# Patient Record
Sex: FEMALE | Race: WHITE | HISPANIC OR LATINO | Employment: OTHER | URBAN - METROPOLITAN AREA
[De-identification: names, ages, dates, MRNs, and addresses within clinical notes are randomized per-mention and may not be internally consistent; named-entity substitution may affect disease eponyms.]

---

## 2022-11-21 PROBLEM — Z3A.19 19 WEEKS GESTATION OF PREGNANCY: Status: ACTIVE | Noted: 2022-10-12

## 2022-11-21 PROBLEM — Z3A.15 15 WEEKS GESTATION OF PREGNANCY: Status: RESOLVED | Noted: 2022-10-12 | Resolved: 2022-11-21

## 2022-11-25 PROBLEM — Z3A.20 20 WEEKS GESTATION OF PREGNANCY: Status: ACTIVE | Noted: 2022-10-12

## 2022-11-25 PROBLEM — O43.199 PLACENTAL CYST AFFECTING PREGNANCY: Status: RESOLVED | Noted: 2022-11-23 | Resolved: 2022-11-25

## 2022-11-25 PROBLEM — R03.0 ELEVATED BLOOD PRESSURE READING WITHOUT DIAGNOSIS OF HYPERTENSION: Status: ACTIVE | Noted: 2022-11-25

## 2022-11-25 PROBLEM — O35.EXX0 PYELECTASIS OF FETUS ON PRENATAL ULTRASOUND: Status: ACTIVE | Noted: 2022-11-25

## 2022-12-15 PROBLEM — Z3A.23 23 WEEKS GESTATION OF PREGNANCY: Status: ACTIVE | Noted: 2022-10-12

## 2022-12-15 NOTE — PROGRESS NOTES
Assessment & Plan  23 y o   at 23w4d presenting for routine prenatal visit  Problem List        Other    23 weeks gestation of pregnancy    Overview     TWG: + 7 167kg (recommended weight gain 25-35lbs)   TDAP vaccine at 28 weeks  [ ]Delivery consent to be signed at 28w  Contraception: undecided  Breastfeeding: yes   MSAFP to be drawn   RTC [id number] for routine prenatal care   : fetal urinary tract dilatation, repeat growth at 32 weeks         Prenatal care, second trimester    Overview     Continue PNV  Level II U/S - scheduled   First trimester screening WNL  Flu 10/24/22  msAFP ordered, will complete in 19th week of pregnancy         Pyelectasis of fetus on prenatal ultrasound    Elevated blood pressure reading without diagnosis of hypertension       ____________________________________________________________  Subjective  She is without complaint  She denies contractions, loss of fluid, or vaginal bleeding  She feels regular fetal movements  Objective  /84 (BP Location: Left arm, Patient Position: Sitting, Cuff Size: Adult)   Pulse (!) 110   Resp 18   Ht 5' 4" (1 626 m)   Wt 55 8 kg (123 lb)   LMP 2022   BMI 21 11 kg/m²   FHR: 150 bpm    Physical Exam  Constitutional:       Appearance: Normal appearance  HENT:      Head: Normocephalic and atraumatic  Cardiovascular:      Rate and Rhythm: Normal rate  Pulmonary:      Effort: Pulmonary effort is normal  No respiratory distress  Abdominal:      Palpations: Abdomen is soft  Tenderness: There is no abdominal tenderness  Musculoskeletal:         General: Normal range of motion  Neurological:      Mental Status: She is alert  Skin:     General: Skin is warm and dry            Patient's Active Problem List  Patient Active Problem List   Diagnosis   • 23 weeks gestation of pregnancy   • Prenatal care, second trimester   • Pyelectasis of fetus on prenatal ultrasound   • Elevated blood pressure reading without diagnosis of hypertension           Yaritza Mcclendon MD  OB/GYN PGY-4  12/19/2022  4:41 PM

## 2022-12-19 ENCOUNTER — ROUTINE PRENATAL (OUTPATIENT)
Dept: OBGYN CLINIC | Facility: CLINIC | Age: 22
End: 2022-12-19

## 2022-12-19 VITALS
HEIGHT: 64 IN | SYSTOLIC BLOOD PRESSURE: 130 MMHG | BODY MASS INDEX: 21 KG/M2 | HEART RATE: 110 BPM | WEIGHT: 123 LBS | DIASTOLIC BLOOD PRESSURE: 84 MMHG | RESPIRATION RATE: 18 BRPM

## 2022-12-19 DIAGNOSIS — Z34.92 PRENATAL CARE, SECOND TRIMESTER: ICD-10-CM

## 2022-12-19 DIAGNOSIS — Z3A.23 23 WEEKS GESTATION OF PREGNANCY: Primary | ICD-10-CM

## 2023-01-10 ENCOUNTER — APPOINTMENT (OUTPATIENT)
Dept: LAB | Facility: CLINIC | Age: 23
End: 2023-01-10

## 2023-01-10 DIAGNOSIS — Z3A.23 23 WEEKS GESTATION OF PREGNANCY: ICD-10-CM

## 2023-01-10 LAB
BASOPHILS # BLD AUTO: 0.04 THOUSANDS/ÂΜL (ref 0–0.1)
BASOPHILS NFR BLD AUTO: 0 % (ref 0–1)
EOSINOPHIL # BLD AUTO: 0.06 THOUSAND/ÂΜL (ref 0–0.61)
EOSINOPHIL NFR BLD AUTO: 1 % (ref 0–6)
ERYTHROCYTE [DISTWIDTH] IN BLOOD BY AUTOMATED COUNT: 12.9 % (ref 11.6–15.1)
GLUCOSE 1H P 50 G GLC PO SERPL-MCNC: 139 MG/DL (ref 40–134)
HCT VFR BLD AUTO: 33.6 % (ref 34.8–46.1)
HGB BLD-MCNC: 10.6 G/DL (ref 11.5–15.4)
IMM GRANULOCYTES # BLD AUTO: 0.07 THOUSAND/UL (ref 0–0.2)
IMM GRANULOCYTES NFR BLD AUTO: 1 % (ref 0–2)
LYMPHOCYTES # BLD AUTO: 1.61 THOUSANDS/ÂΜL (ref 0.6–4.47)
LYMPHOCYTES NFR BLD AUTO: 17 % (ref 14–44)
MCH RBC QN AUTO: 29.8 PG (ref 26.8–34.3)
MCHC RBC AUTO-ENTMCNC: 31.5 G/DL (ref 31.4–37.4)
MCV RBC AUTO: 94 FL (ref 82–98)
MONOCYTES # BLD AUTO: 0.56 THOUSAND/ÂΜL (ref 0.17–1.22)
MONOCYTES NFR BLD AUTO: 6 % (ref 4–12)
NEUTROPHILS # BLD AUTO: 7.41 THOUSANDS/ÂΜL (ref 1.85–7.62)
NEUTS SEG NFR BLD AUTO: 75 % (ref 43–75)
NRBC BLD AUTO-RTO: 0 /100 WBCS
PLATELET # BLD AUTO: 292 THOUSANDS/UL (ref 149–390)
PMV BLD AUTO: 11.1 FL (ref 8.9–12.7)
RBC # BLD AUTO: 3.56 MILLION/UL (ref 3.81–5.12)
WBC # BLD AUTO: 9.75 THOUSAND/UL (ref 4.31–10.16)

## 2023-01-11 ENCOUNTER — TELEPHONE (OUTPATIENT)
Dept: OBGYN CLINIC | Facility: CLINIC | Age: 23
End: 2023-01-11

## 2023-01-11 DIAGNOSIS — R73.09 ELEVATED GLUCOSE TOLERANCE TEST: Primary | ICD-10-CM

## 2023-01-11 LAB — RPR SER QL: NORMAL

## 2023-01-11 NOTE — TELEPHONE ENCOUNTER
Attempted to contact pt regarding results  Spoke with pt's spouse Tavia Jeter left a message with Tamar Grant to have pt call back our office for the lab results, Tamar Grant stated that pt will call tomorrow  Verbalized understanding no questions at this time  DC instructions/F/U PMD and Cards

## 2023-01-11 NOTE — TELEPHONE ENCOUNTER
----- Message from Denise Lazo MD sent at 1/11/2023  1:01 PM EST -----  Please inform patient that 1 hour GTT was elevated; she needs to complete the 3 hour GTT, which is ordered

## 2023-01-16 ENCOUNTER — ROUTINE PRENATAL (OUTPATIENT)
Dept: OBGYN CLINIC | Facility: CLINIC | Age: 23
End: 2023-01-16

## 2023-01-16 VITALS
SYSTOLIC BLOOD PRESSURE: 115 MMHG | BODY MASS INDEX: 20.9 KG/M2 | HEIGHT: 64 IN | WEIGHT: 122.4 LBS | DIASTOLIC BLOOD PRESSURE: 71 MMHG | HEART RATE: 97 BPM

## 2023-01-16 DIAGNOSIS — Z23 NEED FOR TDAP VACCINATION: ICD-10-CM

## 2023-01-16 DIAGNOSIS — Z3A.27 27 WEEKS GESTATION OF PREGNANCY: Primary | ICD-10-CM

## 2023-01-16 DIAGNOSIS — Z34.92 PRENATAL CARE, SECOND TRIMESTER: ICD-10-CM

## 2023-01-16 RX ORDER — PRENATAL NO.167/FOLIC ACID/DHA 0.4MG-25MG
TABLET,CHEWABLE ORAL
COMMUNITY

## 2023-01-16 NOTE — PROGRESS NOTES
Julia Burgos presents today for routine OB visit at 27w4d  She is a   Blood Pressure: 115/71  Wt=55 5 kg (122 lb 6 4 oz); Body mass index is 21 01 kg/m² ; TWG=9 253 kg (20 lb 6 4 oz)  Fetal Heart Rate: 145; Fundal Height (cm): 27 cm  Abdomen: gravid, soft, non-tender  She reports no complaints or concerns  Denies uterine contractions or persistent cramping  Denies vaginal bleeding or leaking of fluid  Reports fetal movement  Scheduled for ultrasound 23  Reviewed elevated 1 hour GTT results, orders placed for 3 hour GTT  Reviewed instructions for testing  Discussed tdap vaccine, she accepts this today  Reviewed common discomforts of pregnancy in second trimester and warning signs  Advised to continue medications and return in 2 weeks  Current Outpatient Medications   Medication Instructions   • doxylamine (UNISOM) 12 5 mg, Oral, Daily at bedtime PRN   • Prenatal MV & Min w/FA-DHA (One A Day Prenatal) 0 4-25 MG CHEW Oral   • pyridoxine (B-6) 25 mg, Oral, 3 times daily     Laboratory workup: initial OB labs (complete); 28 week labs (1hr GTT elevated at 139, 3hr GTT ordered); 36 week cultures (collect @36w)    Genetic Screening: NIPS low risk    Vaccinations: influenza (given 10/24/22); Tdap (given 23); COVID-19 (recommended)    Postpartum contraception: desires unsure     Fetal Ultrasounds:  10/12/22 13w6d: Normal growth  Posterior placenta  A surface placental cyst is appreciated adjacent to the umbilical cord insertion  Follow-up ultrasounds will further elucidate the significance of this finding which generally resolves throughout pregnancy  F/u at 20w  22 20w1d: Vertex  Normal growth  Posterior placenta  Bilateral low risk urinary tract dilation (UTDA1), which has a high likelihood of spontaneous resolution prenatally, or after birth   Follow-up evaluation of fetal growth and re-evaluation of fetal kidneys was advised at around 32 weeks gestation  23 Next US    DELFINA 4/13/2023 Problems (from 08/30/22 to present)     Problem Noted Resolved    Prenatal care, second trimester 10/24/2022 by Eron Hayes MD No    Overview Addendum 10/24/2022  6:25 PM by Eron Hayes MD     Continue PNV  Level II U/S - scheduled 11/25  First trimester screening WNL  Flu 10/24/22  msAFP ordered, will complete in 19th week of pregnancy         27 weeks gestation of pregnancy 10/12/2022 by Samantha Ashford MD No    Overview Addendum 12/15/2022 11:52 AM by Chris Marquez MD     TWG: + 7 167kg (recommended weight gain 25-35lbs)   TDAP vaccine at 28 weeks  [ ]Delivery consent to be signed at 28w  Contraception: undecided  Breastfeeding: yes   MSAFP to be drawn   RTC [id number] for routine prenatal care   11/25: fetal urinary tract dilatation, repeat growth at 32 weeks

## 2023-01-18 NOTE — PROGRESS NOTES
28 week education packet provided to patient on 01/18/23  Included in packet: Third Trimester paperwork  Delivery consent   Birthing room support person rules and acknowledgment  Birth Plan   Welcome information  Birth certificate worksheet   Consent for Photographers  Perineal/ Vaginal massage   Pediatric practices and locations     Breast pump not ordered, pt is self pay      Tdap given

## 2023-01-23 ENCOUNTER — TELEPHONE (OUTPATIENT)
Dept: OBGYN CLINIC | Facility: CLINIC | Age: 23
End: 2023-01-23

## 2023-01-23 ENCOUNTER — LAB (OUTPATIENT)
Dept: LAB | Facility: CLINIC | Age: 23
End: 2023-01-23

## 2023-01-23 DIAGNOSIS — Z34.92 PRENATAL CARE, SECOND TRIMESTER: ICD-10-CM

## 2023-01-23 DIAGNOSIS — Z3A.27 27 WEEKS GESTATION OF PREGNANCY: ICD-10-CM

## 2023-01-23 LAB
GLUCOSE 1H P 100 G GLC PO SERPL-MCNC: 165 MG/DL (ref 65–179)
GLUCOSE 2H P 100 G GLC PO SERPL-MCNC: 131 MG/DL (ref 65–154)
GLUCOSE 3H P 100 G GLC PO SERPL-MCNC: 147 MG/DL (ref 65–139)
GLUCOSE P FAST SERPL-MCNC: 80 MG/DL (ref 65–94)
GLUCOSE SERPL-MCNC: 75 MG/DL (ref 65–140)

## 2023-01-23 NOTE — TELEPHONE ENCOUNTER
----- Message from Ade Kaba, [address]  sent at 1/23/2023  1:34 PM EST -----  Please let her know that she passed her 3 hour glucose test and she does not have gestational diabetes  Thank you!

## 2023-01-23 NOTE — TELEPHONE ENCOUNTER
Pt speaks Hungarian , Beestar  ( [phone]) assisted in making this call   Pt notified Normal 3hr glucose test

## 2023-02-14 PROBLEM — Z3A.32 32 WEEKS GESTATION OF PREGNANCY: Status: ACTIVE | Noted: 2022-10-12

## 2023-02-14 NOTE — ASSESSMENT & PLAN NOTE
Delivery consent signed today  [facility]  present  All questions answered to patient and partner's satisfaction  Return to office in 2 weeks

## 2023-02-14 NOTE — PROGRESS NOTES
[address] [address]  [id number]  2000        ASSESSMENT/PLAN:  Problem List        Other    32 weeks gestation of pregnancy    Overview     TWG: + 7 167kg (recommended weight gain 25-35lbs)   TDAP vaccine at 28 weeks  Delivery consent signed at 28w  Contraception: undecided  Breastfeeding: yes   MSAFP to be drawn   RTC [id number] for routine prenatal care   : fetal urinary tract dilatation, repeat growth at 32 weeks         Current Assessment & Plan     Delivery consent signed today  [facility]  present  All questions answered to patient and partner's satisfaction  Return to office in 2 weeks  Prenatal care, second trimester    Overview     Continue PNV  Level II U/S - scheduled   First trimester screening WNL  Flu 10/24/22  msAFP ordered, will complete in 19th week of pregnancy         Pyelectasis of fetus on prenatal ultrasound    Elevated blood pressure reading without diagnosis of hypertension               Subjective: 23 y o   31w5d here for prenatal visit  She denies consistent contractions but endorses occasionally cramping  She denies leakage of fluid and vaginal bleeding  She endorses good fetal movement  Objective:  Pre- Vitals    Flowsheet Row Most Recent Value   Prenatal Assessment    Prenatal Vitals    Blood Pressure 104/67   Weight - Scale 57 2 kg (126 lb 3 2 oz)   Urine Albumin/Glucose    Dilation/Effacement/Station    Vaginal Drainage    Edema            Pregnancy Plan:  Pregnancy: Kelli Bumps  Fetal sex: Male     Delivery Plans  Planned delivery method: Vaginal  Planned delivery location: [facility]  Planned anesthesia: Epidural  Acceptable blood products: All     Post-Delivery Plans  Feeding intentions: Breast Milk      General: Well appearing, no distress  Respiratory: Unlabored breathing  Cardiovascular: Regular rate  FHR: 143bpm  Abdomen: Soft, gravid, nontender    Fundal Height: Appropriate for gestational age  31cm  Extremities: Warm and well perfused  Non tender          D/w Dr Prince Wang MD  OBGYN PGY-1  2/17/2023 8:59 AM

## 2023-02-17 ENCOUNTER — ROUTINE PRENATAL (OUTPATIENT)
Dept: OBGYN CLINIC | Facility: CLINIC | Age: 23
End: 2023-02-17

## 2023-02-17 ENCOUNTER — ULTRASOUND (OUTPATIENT)
Facility: HOSPITAL | Age: 23
End: 2023-02-17

## 2023-02-17 VITALS
HEART RATE: 93 BPM | SYSTOLIC BLOOD PRESSURE: 104 MMHG | BODY MASS INDEX: 21.54 KG/M2 | HEIGHT: 64 IN | DIASTOLIC BLOOD PRESSURE: 67 MMHG | WEIGHT: 126.2 LBS

## 2023-02-17 VITALS
DIASTOLIC BLOOD PRESSURE: 62 MMHG | BODY MASS INDEX: 21.01 KG/M2 | SYSTOLIC BLOOD PRESSURE: 118 MMHG | WEIGHT: 126.1 LBS | HEIGHT: 65 IN | HEART RATE: 91 BPM

## 2023-02-17 DIAGNOSIS — Z3A.32 32 WEEKS GESTATION OF PREGNANCY: Primary | ICD-10-CM

## 2023-02-17 DIAGNOSIS — Z3A.32 32 WEEKS GESTATION OF PREGNANCY: ICD-10-CM

## 2023-02-17 DIAGNOSIS — O36.5930 POOR FETAL GROWTH AFFECTING MANAGEMENT OF MOTHER IN THIRD TRIMESTER, SINGLE OR UNSPECIFIED FETUS: Primary | ICD-10-CM

## 2023-02-17 NOTE — LETTER
February 17, 2023     Anais De La Torre, [address] [place] [address]    Patient: Shimon Tomlinson   YOB: 2000   Date of Visit: 2/17/2023       Dear Dr Mirza Lugo: Thank you for referring Shimon Tomlinson to me for evaluation  Below are my notes for this consultation  If you have questions, please do not hesitate to call me  I look forward to following your patient along with you  Sincerely,        Crystal Dsouza MD        CC: No Recipients  Crystal Dsouza MD  2/17/2023 11:43 PM  Sign when Signing Visit  Shimon Tomlinson has no complaints today  She reports regular fetal movements and does not report any problems  She is here today at 32w1d for an ultrasound for growth review the renal pyelectasis noted on her previous scan  On her phone Rejiroshni Acosta had a certified  who reports her name was Sari Acosta so I use this  for today's discussion  Problem list:  1  Lateral renal pyelectasis measuring 4 to 5 mm  2  Low BMI    Ultrasound findings: The ultrasound today shows a fetus who is measuring in the 9th percentile with the fetal abdomen in the 6 percentile  Umbilical Dopplers SHAQUILLE and biophysical profile are reassuring  The fetal pyelectasis has resolved  Pregnancy ultrasound has limitations and is unable to detect all forms of fetal congenital abnormalities  The inaccuracy in the EFW can be off by 1 lb either way in the third trimester  Specific counseling was provided on the following problems:  1  Intrauterine Growth Restriction (IUGR): We reviewed the finding of fetal growth restriction today, which is defined either as estimated fetal weight, OR fetal abdominal circumference <10% ile for gestational age   Possible etiologies include incorrect dates, ultrasound variance, family history of small babies, genetic syndromes, congenital infections, maternal medical comorbidities, and uteroplacental insufficiency  In the third trimester ultrasound estimated weights can be off by 1 lb either way  For isolated fetal growth restriction between the 3 to 9% without additional findings, I recommend close outpatient surveillance with weekly NST, and weekly evaluation of amniotic fluid and umbilical artery Dopplers  Fetal growth should be re-assessed every 3 weeks  If the above  surveillance is reassuring, and Doppler indices remain normal, delivery is recommended between 38 0/7 and 39 6/7 weeks gestation pending future growth scans  Change in umbilical artery Dopplers, non-reassuring antepartum surveillance, or the presence of new obstetric indications may be an indication for sooner delivery  Fetal kick counting is recommended daily  She should be closely observed for symptoms of preeclampsia or gestational hypertension which can be found more often in pregnancies being monitored for fetal growth restriction  Follow up recommended:   1  Recommend a follow-up ultrasound for SHAQUILLE NST and Dopplers weekly with a follow-up growth scan in 3 weeks  2  Recommend daily fetal kick counts  Pre visit time reviewing her records   10 minutes  Face to face time 15 minutes  Post visit time on documentation of note, updating her problem list, adding orders and prescriptions 10 minutes  Procedures that were completed today were charged separately  The level of decision making was moderate complexity      Moe Sanchez MD

## 2023-02-18 NOTE — PROGRESS NOTES
Balbir Cortez has no complaints today  She reports regular fetal movements and does not report any problems  She is here today at 32w1d for an ultrasound for growth review the renal pyelectasis noted on her previous scan  On her phone Yanet Munguia had a certified  who reports her name was Con Munguia so I use this  for today's discussion  Problem list:  1  Lateral renal pyelectasis measuring 4 to 5 mm  2  Low BMI    Ultrasound findings: The ultrasound today shows a fetus who is measuring in the 9th percentile with the fetal abdomen in the 6 percentile  Umbilical Dopplers SHAQUILLE and biophysical profile are reassuring  The fetal pyelectasis has resolved  Pregnancy ultrasound has limitations and is unable to detect all forms of fetal congenital abnormalities  The inaccuracy in the EFW can be off by 1 lb either way in the third trimester  Specific counseling was provided on the following problems:  1  Intrauterine Growth Restriction (IUGR): We reviewed the finding of fetal growth restriction today, which is defined either as estimated fetal weight, OR fetal abdominal circumference <10% ile for gestational age  Possible etiologies include incorrect dates, ultrasound variance, family history of small babies, genetic syndromes, congenital infections, maternal medical comorbidities, and uteroplacental insufficiency  In the third trimester ultrasound estimated weights can be off by 1 lb either way  For isolated fetal growth restriction between the 3 to 9% without additional findings, I recommend close outpatient surveillance with weekly NST, and weekly evaluation of amniotic fluid and umbilical artery Dopplers  Fetal growth should be re-assessed every 3 weeks  If the above  surveillance is reassuring, and Doppler indices remain normal, delivery is recommended between 38 0/7 and 39 6/7 weeks gestation pending future growth scans   Change in umbilical artery Dopplers, non-reassuring antepartum surveillance, or the presence of new obstetric indications may be an indication for sooner delivery  Fetal kick counting is recommended daily  She should be closely observed for symptoms of preeclampsia or gestational hypertension which can be found more often in pregnancies being monitored for fetal growth restriction  Follow up recommended:   1  Recommend a follow-up ultrasound for SHAQUILLE NST and Dopplers weekly with a follow-up growth scan in 3 weeks  2  Recommend daily fetal kick counts  Pre visit time reviewing her records   10 minutes  Face to face time 15 minutes  Post visit time on documentation of note, updating her problem list, adding orders and prescriptions 10 minutes  Procedures that were completed today were charged separately  The level of decision making was moderate complexity      Bernadette Jimenez MD

## 2023-02-23 NOTE — PATIENT INSTRUCTIONS
Thank you for choosing us for your  care today  If you have any questions about your ultrasound or care, please do not hesitate to contact us or your primary obstetrician  Some general instructions for your pregnancy are:    Exercise: Aim for 22 minutes per day (150 minutes per week) of regular exercise  Walking is great! Nutrition: Choose healthy sources of calcium, iron, and protein  Learn about Preeclampsia: preeclampsia is a common, serious high blood pressure complication in pregnancy  A blood pressure of 968BAQX (systolic or top number) or 75OAWD (diastolic or bottom number) is not normal and needs evaluation by your doctor  Aspirin is sometimes prescribed in early pregnancy to prevent preeclampsia in women with risk factors - ask your obstetrician if you should be on this medication  For more resources, visit:  [employer] fi  If you smoke, try to reduce how many cigarettes you smoke or try to quit completely  Do not vape  Other warning signs to watch out for in pregnancy or postpartum: chest pain, obstructed breathing or shortness of breath, seizures, thoughts of hurting yourself or your baby, bleeding, a painful or swollen leg, fever, or headache (see [facility] POST-BIRTH Warning Signs campaign)  If these happen call 911  Itching is also not normal in pregnancy and if you experience this, especially over your hands and feet, potentially worse at night, notify your doctors

## 2023-02-24 ENCOUNTER — TELEPHONE (OUTPATIENT)
Dept: OBGYN CLINIC | Facility: CLINIC | Age: 23
End: 2023-02-24

## 2023-02-24 ENCOUNTER — ULTRASOUND (OUTPATIENT)
Facility: HOSPITAL | Age: 23
End: 2023-02-24

## 2023-02-24 VITALS
HEART RATE: 119 BPM | SYSTOLIC BLOOD PRESSURE: 120 MMHG | WEIGHT: 125.22 LBS | DIASTOLIC BLOOD PRESSURE: 58 MMHG | HEIGHT: 65 IN | BODY MASS INDEX: 20.86 KG/M2

## 2023-02-24 DIAGNOSIS — O36.5930 POOR FETAL GROWTH AFFECTING MANAGEMENT OF MOTHER IN THIRD TRIMESTER, SINGLE OR UNSPECIFIED FETUS: Primary | ICD-10-CM

## 2023-02-24 NOTE — PROGRESS NOTES
Non-Stress Testing:    Non-Stress test, equipment, procedure, and expected outcomes reviewed  Reviewed fetal kick counts and when to call OB  Karrie Nissen Verified patient understanding of fetal kick counts with teach back method  Patient reports feeling daily fetal movements  Patient has no questions or concerns  DR Jaelyn Mahajan viewed NST prior to completion of appointment

## 2023-02-24 NOTE — TELEPHONE ENCOUNTER
Pt c/o "burning in spine" every night x5 days  Pain radiates from midback down to lowback and is a 7/10 on pain scale  Pt reports it happens depending on position and that changing positions does help briefly  She has not tried tylenol for this pain  Will try tylenol and heat  If symptoms worsen over the weekend she will report to triage for further evaluation

## 2023-02-24 NOTE — LETTER
NST sleeve cover sheet    Patient name: Chika Hensley  : 2000  MRN: [id number]    DELFINA: Estimated Date of Delivery: 23    Obstetrician: [address]    Reason(s) for testing:  FGR 3-9%  __________________________________________      Testing frequency:    ___ 2x/wk  _X__ 1x/wk  ___ Dopplers  ___ BPP?       Last growth scan: __________________________________________

## 2023-02-24 NOTE — PROGRESS NOTES
[facility]: Ms Keara Contreras was seen today for umbilical artery Doppler study, SHAQUILLE, and NST  See ultrasound report under "OB Procedures" tab  Please don't hesitate to contact our office with any concerns or questions    Naida Tom MD

## 2023-02-28 ENCOUNTER — ULTRASOUND (OUTPATIENT)
Dept: PERINATAL CARE | Facility: CLINIC | Age: 23
End: 2023-02-28

## 2023-02-28 VITALS
HEIGHT: 61 IN | HEART RATE: 98 BPM | BODY MASS INDEX: 23.37 KG/M2 | DIASTOLIC BLOOD PRESSURE: 62 MMHG | WEIGHT: 123.8 LBS | SYSTOLIC BLOOD PRESSURE: 108 MMHG

## 2023-02-28 DIAGNOSIS — O36.5930 POOR FETAL GROWTH AFFECTING MANAGEMENT OF MOTHER IN THIRD TRIMESTER, SINGLE OR UNSPECIFIED FETUS: Primary | ICD-10-CM

## 2023-02-28 DIAGNOSIS — Z3A.33 33 WEEKS GESTATION OF PREGNANCY: ICD-10-CM

## 2023-02-28 NOTE — PATIENT INSTRUCTIONS
Kick Counts in Pregnancy   WHAT YOU NEED TO KNOW:   What do I need to know about kick counts? Kick counts measure how much your baby is moving in your womb  A kick from your baby can be felt as a twist, turn, swish, roll, or jab  It is common to feel your baby kicking at 26 to 28 weeks of pregnancy  You may feel your baby kick as early as 20 weeks of pregnancy  You may want to start counting at 28 weeks  Why should I measure kick counts? Your baby's movement may provide information about your baby's health  He or she may move less, or not at all, if there are problems  Your baby may move less if he or she is not getting enough oxygen or nutrition from the placenta  Do not smoke while you are pregnant  Smoking decreases the amount of oxygen that gets to your baby  Talk to your healthcare provider if you need help to quit smoking  Tell your healthcare provider as soon as you feel a change in your baby's movements  When do I measure kick counts? Measure kick counts at the same time every day  Measure kick counts when your baby is awake and most active  Your baby may be most active in the evening  How do I measure kick counts? Check that your baby is awake before you measure kick counts  You can wake up your baby by lightly pushing on your belly, walking, or drinking something cold  Your healthcare provider may tell you different ways to measure kick counts  You may be told to do the following:  Use a chart or clock to keep track of the time you start and finish counting  Sit in a chair or lie on your left side  Place your hands on the largest part of your belly  Count until you reach 10 kicks  Write down how much time it takes to count 10 kicks  It may take 30 minutes to 2 hours to count 10 kicks  It should not take more than 2 hours to count 10 kicks  When should I contact my doctor? You feel a change in the number of kicks or movements of your baby       You feel fewer than 10 kicks within 2 hours  You have questions or concerns about your baby's movements  CARE AGREEMENT:   You have the right to help plan your care  Learn about your health condition and how it may be treated  Discuss treatment options with your healthcare providers to decide what care you want to receive  You always have the right to refuse treatment  The above information is an  only  It is not intended as medical advice for individual conditions or treatments  Talk to your doctor, nurse or pharmacist before following any medical regimen to see if it is safe and effective for you  © Copyright [employer] 2022 Information is for End User's use only and may not be sold, redistributed or otherwise used for commercial purposes

## 2023-02-28 NOTE — LETTER
February 28, 2023     Stephany Langley, [address] [place], [place] [address]    Patient: Chino Johnson   YOB: 2000   Date of Visit: 2/28/2023       Dear Dr Meghan Hawkins: Thank you for referring Chino Johnson to me for evaluation  Below are my notes for this consultation  If you have questions, please do not hesitate to call me  I look forward to following your patient along with you           Sincerely,        Ivania Banda MD        CC: No Recipients  Ivania Banda MD  2/28/2023  9:02 AM  Sign when Signing Visit  NST is reactive and umbilical Dopplers are normal  Anuradha Odonnell

## 2023-03-03 ENCOUNTER — ROUTINE PRENATAL (OUTPATIENT)
Dept: OBGYN CLINIC | Facility: CLINIC | Age: 23
End: 2023-03-03

## 2023-03-03 VITALS
HEART RATE: 112 BPM | DIASTOLIC BLOOD PRESSURE: 66 MMHG | HEIGHT: 61 IN | SYSTOLIC BLOOD PRESSURE: 112 MMHG | WEIGHT: 123.2 LBS | BODY MASS INDEX: 23.26 KG/M2

## 2023-03-03 DIAGNOSIS — Z3A.34 34 WEEKS GESTATION OF PREGNANCY: ICD-10-CM

## 2023-03-03 DIAGNOSIS — Z34.93 PRENATAL CARE IN THIRD TRIMESTER: Primary | ICD-10-CM

## 2023-03-03 NOTE — PATIENT INSTRUCTIONS
MEDICATIONS THAT ARE SAFE TO TAKE     Here is a list of medications which are safe for you to take during pregnancy without having to discuss with the nurse practitioner or physician:     * Tylenol/Acetaminophen (headache or fever)   * Benadryl/Diphenhydramine (allergies, runny nose, difficult sleeping, itching)   * Claritin/Loratidine (allergies, runny nose)   * Zyrtec/Cetirizine (allergies, runny nose)   * Allegra/Fexofenadine (allergies)  * Robitussin/Guaifenesin (coughing)   * Sudafed/Pseudoephedrine (runny nose/congestion)   * Colace/Docusate sodium (constipation)   * Maalox/Magnesium hydroxide (heartburn, indigestion)   * Zantac/Ranitidine (heartburn, indigestion)   * Pepcid/Famotidine (heartburn, indigestion)   * Tums/Calcium carbonate (heartburn, nausea)   * Kaopectate/Bismuth subsalicylate (diarrhea)   * Immodium/Loperamide (diarrhea)   * Vitamin B6/Pyrodoxine(nausea)   * Doxylamine/Unisom (nausea, insomnia)     Any other medications should be discussed with either our nurse practitioner or one of our physicians before taking

## 2023-03-03 NOTE — PROGRESS NOTES
Nova Partida presents today for routine OB visit at 34w1d  She is a   Pregnancy is complicated by possible FGR, she is following with [address] [place] [address] for  testing and serial growth scans  Blood Pressure: 112/66  Wt=55 9 kg (123 lb 3 2 oz); Body mass index is 22 96 kg/m² ; TWG=9 616 kg (21 lb 3 2 oz)  Fetal Heart Rate: 130; Fundal Height (cm): 33 cm  Abdomen: gravid, soft, non-tender  She reports mild nasal congestion  Had a low grade fever and body aches earlier in the week, feeling better now  Denies uterine contractions  Denies vaginal bleeding or leaking of fluid  Reports adequate fetal movement of at least 10 movements in 2 hours once daily  Scheduled for  testing 3/8/23  Discussed likely viral illness earlier this week  Reviewed comfort measures and safe medications for nasal congestion  Reviewed premature labor precautions and fetal kick counts  Advised to continue medications and return in 2 weeks  Current Outpatient Medications   Medication Instructions   • doxylamine (UNISOM) 12 5 mg, Oral, Daily at bedtime PRN   • Prenatal MV & Min w/FA-DHA (One A Day Prenatal) 0 4-25 MG CHEW Oral   • pyridoxine (B-6) 25 mg, Oral, 3 times daily     Laboratory workup: initial OB labs (complete); 28 week labs (1hr GTT elevated at 139, 3hr GTT normal); 36 week cultures (collect @36w)    Genetic Screening: NIPS low risk    Vaccinations: influenza (given 10/24/22); Tdap (received 23); COVID-19 (recommended)    Postpartum contraception: desires unsure     Fetal Ultrasounds:  10/12/22 13w6d: Normal growth  Posterior placenta  A surface placental cyst is appreciated adjacent to the umbilical cord insertion  Follow-up ultrasounds will further elucidate the significance of this finding which generally resolves throughout pregnancy  F/u at 20w  22 20w1d: Vertex  Normal growth  Posterior placenta   Bilateral low risk urinary tract dilation (UTDA1), which has a high likelihood of spontaneous resolution prenatally, or after birth  Follow-up evaluation of fetal growth and re-evaluation of fetal kidneys was advised at around 32 weeks gestation  23 32w1d: Vertex  Posterior placenta  Possible FGR, EFW 9% with abdomen in 6%  Fetal pyelectasis resolved  recommend close outpatient surveillance with weekly NST, and weekly evaluation of amniotic fluid and umbilical artery Dopplers  Fetal growth should be re-assessed every 3 weeks  If the above  surveillance is reassuring, and Doppler indices remain normal, delivery is recommended between 38 0/7 and 39 6/7 weeks gestation pending future growth scans  DELFINA 2023 Problems (from 22 to present)     Problem Noted Resolved    Poor fetal growth affecting management of mother in third trimester 2023 by Zhao Quevedo MD No    Overview Addendum 3/3/2023  2:24 PM by ANATOLY Sorensen     -Noted at 32 weeks  9th percentile with normal Dopplers  -Recommend close outpatient surveillance with weekly NST, and weekly evaluation of amniotic fluid and umbilical artery Dopplers  Fetal growth should be re-assessed every 3 weeks  If the above  surveillance is reassuring, and Doppler indices remain normal, delivery is recommended between 38 0/7 and 39 6/7 weeks gestation pending future growth scans              Prenatal care in third trimester 10/24/2022 by Luiza Bowman MD No    Overview Addendum 10/24/2022  6:25 PM by Luiza Bowman MD     Continue PNV  Level II U/S - scheduled   First trimester screening WNL  Flu 10/24/22  msAFP ordered, will complete in 19th week of pregnancy         34 weeks gestation of pregnancy 10/12/2022 by Paul Albright MD No    Overview Addendum 2023  8:50 AM by Ted Paulino MD     TWG: + 7 167kg (recommended weight gain 25-35lbs)   TDAP vaccine at 28 weeks  Delivery consent signed at 28w  Contraception: undecided  Breastfeeding: yes   MSAFP to be drawn RTC [id number] for routine prenatal care  US 11/25: fetal urinary tract dilatation, repeat growth at 32 weeks

## 2023-03-07 NOTE — PATIENT INSTRUCTIONS
Thank you for choosing us for your  care today  If you have any questions about your ultrasound or care, please do not hesitate to contact us or your primary obstetrician  Some general instructions for your pregnancy are:    Exercise: Aim for 22 minutes per day (150 minutes per week) of regular exercise  Walking is great! Nutrition: Choose healthy sources of calcium, iron, and protein  Learn about Preeclampsia: preeclampsia is a common, serious high blood pressure complication in pregnancy  A blood pressure of 692AQCL (systolic or top number) or 23OCSE (diastolic or bottom number) is not normal and needs evaluation by your doctor  Aspirin is sometimes prescribed in early pregnancy to prevent preeclampsia in women with risk factors - ask your obstetrician if you should be on this medication  For more resources, visit:  [employer] fi  If you smoke, try to reduce how many cigarettes you smoke or try to quit completely  Do not vape  Other warning signs to watch out for in pregnancy or postpartum: chest pain, obstructed breathing or shortness of breath, seizures, thoughts of hurting yourself or your baby, bleeding, a painful or swollen leg, fever, or headache (see [facility] POST-BIRTH Warning Signs campaign)  If these happen call 911  Itching is also not normal in pregnancy and if you experience this, especially over your hands and feet, potentially worse at night, notify your doctors

## 2023-03-08 ENCOUNTER — ULTRASOUND (OUTPATIENT)
Facility: HOSPITAL | Age: 23
End: 2023-03-08

## 2023-03-08 VITALS
HEART RATE: 108 BPM | BODY MASS INDEX: 23.26 KG/M2 | WEIGHT: 124.78 LBS | DIASTOLIC BLOOD PRESSURE: 62 MMHG | SYSTOLIC BLOOD PRESSURE: 120 MMHG

## 2023-03-08 DIAGNOSIS — O36.5930 POOR FETAL GROWTH AFFECTING MANAGEMENT OF MOTHER IN THIRD TRIMESTER, SINGLE OR UNSPECIFIED FETUS: Primary | ICD-10-CM

## 2023-03-08 DIAGNOSIS — Z36.89 ENCOUNTER FOR ULTRASOUND TO ASSESS FETAL GROWTH: ICD-10-CM

## 2023-03-08 DIAGNOSIS — Z3A.34 34 WEEKS GESTATION OF PREGNANCY: ICD-10-CM

## 2023-03-08 DIAGNOSIS — R03.0 ELEVATED BLOOD PRESSURE READING WITHOUT DIAGNOSIS OF HYPERTENSION: ICD-10-CM

## 2023-03-08 NOTE — PROGRESS NOTES
[facility]: Ms Nabil Shaikh was seen today for fetal growth assessment ultrasound  See ultrasound report under "OB Procedures" tab  The time spent on this established patient on the encounter date included 5 minutes previsit service time reviewing records and precharting, 6 minutes face-to-face service time counseling regarding results and coordinating care, and  5 minutes charting, totalling 16 minutes  Please don't hesitate to contact our office with any concerns or questions    -Mitchell Garvin MD

## 2023-03-17 ENCOUNTER — ROUTINE PRENATAL (OUTPATIENT)
Dept: OBGYN CLINIC | Facility: CLINIC | Age: 23
End: 2023-03-17

## 2023-03-17 VITALS
SYSTOLIC BLOOD PRESSURE: 116 MMHG | WEIGHT: 127.6 LBS | BODY MASS INDEX: 24.09 KG/M2 | HEART RATE: 102 BPM | DIASTOLIC BLOOD PRESSURE: 76 MMHG | HEIGHT: 61 IN

## 2023-03-17 DIAGNOSIS — Z34.93 PRENATAL CARE IN THIRD TRIMESTER: ICD-10-CM

## 2023-03-17 DIAGNOSIS — Z3A.36 36 WEEKS GESTATION OF PREGNANCY: Primary | ICD-10-CM

## 2023-03-17 NOTE — PROGRESS NOTES
Fabienne  presents today for routine OB visit at 36w1d  She is a   Blood Pressure: 116/76  Wt=57 9 kg (127 lb 9 6 oz); Body mass index is 23 78 kg/m² ; TWG=11 6 kg (25 lb 9 6 oz)  Fetal Heart Rate: 125; Fundal Height (cm): 35 cm  Abdomen: gravid, soft, non-tender  She reports intermittent sciatica on the left side  Denies uterine contractions  Denies vaginal bleeding or leaking of fluid  Reports adequate fetal movement of at least 10 movements in 2 hours once daily  Scheduled for ultrasound 23  GBS culture collected today  Reviewed discomforts of third trimester and comfort measures  Reviewed premature labor precautions and fetal kick counts  Advised to continue medications and return in 1 weeks  Current Outpatient Medications   Medication Instructions   • doxylamine (UNISOM) 12 5 mg, Oral, Daily at bedtime PRN   • Prenatal MV & Min w/FA-DHA (One A Day Prenatal) 0 4-25 MG CHEW Oral   • pyridoxine (B-6) 25 mg, Oral, 3 times daily     Laboratory workup: initial OB labs (complete); 28 week labs (1hr GTT elevated at 139, 3hr GTT normal); 36 week cultures (collected 3/17/23)    Genetic Screening: NIPS low risk    Vaccinations: influenza (given 10/24/22); Tdap (received 23); COVID-19 (recommended)    Postpartum contraception: desires unsure     Fetal Ultrasounds:  10/12/22 13w6d: Normal growth  Posterior placenta  A surface placental cyst is appreciated adjacent to the umbilical cord insertion  Follow-up ultrasounds will further elucidate the significance of this finding which generally resolves throughout pregnancy  F/u at 20w  22 20w1d: Vertex  Normal growth  Posterior placenta  Bilateral low risk urinary tract dilation (UTDA1), which has a high likelihood of spontaneous resolution prenatally, or after birth  Follow-up evaluation of fetal growth and re-evaluation of fetal kidneys was advised at around 32 weeks gestation  23 32w1d: Vertex  Posterior placenta  Possible FGR, EFW 9% with abdomen in 6%  Fetal pyelectasis resolved  recommend close outpatient surveillance with weekly NST, and weekly evaluation of amniotic fluid and umbilical artery Dopplers  Fetal growth should be re-assessed every 3 weeks  If the above  surveillance is reassuring, and Doppler indices remain normal, delivery is recommended between 38 0/7 and 39 6/7 weeks gestation pending future growth scans  3/8/23 34w6d: Vertex  Posterior placenta  EFW 15%, fetal growth restriction has resolved  Both the EFW and abdominal circumference are over the 10th percentile  Follow-up in 3 to 4 weeks for repeat growth  No need for weekly  ST/SHAQUILLE/Doppler in the interim     DELFINA 2023 Problems (from 22 to present)     Problem Noted Resolved    Poor fetal growth affecting management of mother in third trimester 2023 by Renate Puentes MD No    Overview Addendum 3/17/2023 10:40 AM by ANATOLY Whaley     -Noted at 32 weeks  9th percentile with normal Dopplers  -Recommend close outpatient surveillance with weekly NST, and weekly evaluation of amniotic fluid and umbilical artery Dopplers  Fetal growth should be re-assessed every 3 weeks  If the above  surveillance is reassuring, and Doppler indices remain normal, delivery is recommended between 38 0/7 and 39 6/7 weeks gestation pending future growth scans    -3/8/23 34w6d: Vertex  Posterior placenta  EFW 15%, fetal growth restriction has resolved  Both the EFW and abdominal circumference are over the 10th percentile  Follow-up in 3 to 4 weeks for repeat growth   No need for weekly  ST/SHAQUILLE/Doppler in the interim              Prenatal care in third trimester 10/24/2022 by Yuniel Norton MD No    Overview Addendum 10/24/2022  6:25 PM by Yuniel Norton MD     Continue PNV  Level II U/S - scheduled   First trimester screening WNL  Flu 10/24/22  msAFP ordered, will complete in 19th week of pregnancy         36 weeks gestation of pregnancy 10/12/2022 by Stacia Landry MD No    Overview Addendum 2/17/2023  8:50 AM by Charity Cruz MD     TWG: + 7 167kg (recommended weight gain 25-35lbs)   TDAP vaccine at 28 weeks  Delivery consent signed at 28w  Contraception: undecided  Breastfeeding: yes   MSAFP to be drawn   RTC [id number] for routine prenatal care   11/25: fetal urinary tract dilatation, repeat growth at 32 weeks

## 2023-03-19 LAB — GP B STREP DNA SPEC QL NAA+PROBE: POSITIVE

## 2023-03-24 ENCOUNTER — ROUTINE PRENATAL (OUTPATIENT)
Dept: OBGYN CLINIC | Facility: CLINIC | Age: 23
End: 2023-03-24

## 2023-03-24 VITALS
HEART RATE: 109 BPM | BODY MASS INDEX: 24.05 KG/M2 | SYSTOLIC BLOOD PRESSURE: 113 MMHG | WEIGHT: 127.4 LBS | DIASTOLIC BLOOD PRESSURE: 74 MMHG | HEIGHT: 61 IN

## 2023-03-24 DIAGNOSIS — Z3A.37 37 WEEKS GESTATION OF PREGNANCY: Primary | ICD-10-CM

## 2023-03-24 DIAGNOSIS — O99.820 GBS (GROUP B STREPTOCOCCUS CARRIER), +RV CULTURE, CURRENTLY PREGNANT: ICD-10-CM

## 2023-03-24 DIAGNOSIS — Z34.93 PRENATAL CARE IN THIRD TRIMESTER: ICD-10-CM

## 2023-03-24 NOTE — PROGRESS NOTES
Jana Zhou 2000 female MRN: [id number]    Prenatal Visit    SUBJECTIVE    CC: Routine Prenatal Visit    HPI:  Jana Zhou is a 26 y o   at 42w4d who presents for a routine prenatal visit  [facility]  present  She reports she feels good FM, denies any LOF, VB  Does have infrequent contractions  Since last week, started having mucous discharge, was told it was mucous plug  Notices belly gets harder in the nighttime and is happening more frequently  Every contraction lasts about 1 minute and there are hours in between each one  Fundal height measures at 35 cm and FHR is 135 today  The patient complains of backache  Spine burning sensation, relieved with warm patch but now getting worse and warm patch is not helping anymore  Denies dysuria  Review of Systems   Constitutional: Negative for chills and fever  HENT: Negative for congestion and rhinorrhea  Respiratory: Negative for cough and shortness of breath  Cardiovascular: Negative for leg swelling  Gastrointestinal: Negative for abdominal pain, diarrhea, nausea and vomiting  Genitourinary: Positive for vaginal discharge  Negative for dysuria, pelvic pain and vaginal bleeding  Musculoskeletal: Positive for back pain  Skin: Negative for rash and wound  Neurological: Negative for dizziness, light-headedness and headaches  Psychiatric/Behavioral: Negative for agitation and confusion         Historical Information   The patient history was reviewed as follows:  OB History        1    Para        Term                AB        Living           SAB        IAB        Ectopic        Multiple        Live Births                   DELFINA 2023 Problems (from 22 to present)     Problem Noted Resolved    Poor fetal growth affecting management of mother in third trimester 2023 by Leo Gómez MD No    Overview Addendum 3/17/2023 10:40 AM by ANATOLY Gómez     -Noted at 32 weeks  9th percentile with normal Dopplers  -Recommend close outpatient surveillance with weekly NST, and weekly evaluation of amniotic fluid and umbilical artery Dopplers  Fetal growth should be re-assessed every 3 weeks  If the above  surveillance is reassuring, and Doppler indices remain normal, delivery is recommended between 38 0/7 and 39 6/7 weeks gestation pending future growth scans    -3/8/23 34w6d: Vertex  Posterior placenta  EFW 15%, fetal growth restriction has resolved  Both the EFW and abdominal circumference are over the 10th percentile  Follow-up in 3 to 4 weeks for repeat growth  No need for weekly  ST/SHAQUILLE/Doppler in the interim              Prenatal care in third trimester 10/24/2022 by Malou Argueta MD No    Overview Addendum 10/24/2022  6:25 PM by Malou Argueta MD     Continue PNV  Level II U/S - scheduled   First trimester screening WNL  Flu 10/24/22  msAFP ordered, will complete in 19th week of pregnancy         36 weeks gestation of pregnancy 10/12/2022 by Felicity Bryant MD No    Overview Addendum 2023  8:50 AM by Annemarie Crump MD     TWG: + 7 167kg (recommended weight gain 25-35lbs)   TDAP vaccine at 28 weeks  Delivery consent signed at 28w  Contraception: undecided  Breastfeeding: yes   MSAFP to be drawn   RTC [id number] for routine prenatal care   : fetal urinary tract dilatation, repeat growth at 32 weeks             No past medical history on file  No past surgical history on file  Social History   Social History     Substance and Sexual Activity   Alcohol Use Never     Social History     Substance and Sexual Activity   Drug Use Never     Social History     Tobacco Use   Smoking Status Never   Smokeless Tobacco Never       Medications:   Meds/Allergies     No Known Allergies    OBJECTIVE    Vitals:      Wt Readings from Last 3 Encounters:   23 57 8 kg (127 lb 6 4 oz)   23 57 9 kg (127 lb 9 6 oz)   23 56 6 kg (124 lb 12 5 oz) Temp Readings from Last 3 Encounters:   No data found for Temp     BP Readings from Last 3 Encounters:   23 113/74   23 116/76   23 120/62     Pulse Readings from Last 3 Encounters:   23 (!) 109   23 102   23 (!) 108       Pre-Gravid BMI: 19 01 Pre-Gravid Weight: 46 3 kg (102 lb)  Current BMI: Body mass index is 23 74 kg/m²  Current Weight: Weight - Scale: 57 8 kg (127 lb 6 4 oz)     Physical Exam  Constitutional:       General: She is not in acute distress  Appearance: Normal appearance  Cardiovascular:      Rate and Rhythm: Normal rate and regular rhythm  Pulses: Normal pulses  Pulmonary:      Effort: Pulmonary effort is normal  No respiratory distress  Breath sounds: Normal breath sounds  Abdominal:      Tenderness: There is no abdominal tenderness  There is no right CVA tenderness, left CVA tenderness, guarding or rebound  Comments: Gravid     Musculoskeletal:         General: Normal range of motion  Right lower leg: No edema  Left lower leg: No edema  Neurological:      General: No focal deficit present  Mental Status: She is alert  Mental status is at baseline  Skin:     General: Skin is warm and dry  Findings: No rash  Psychiatric:         Mood and Affect: Mood normal          Behavior: Behavior normal    Vitals reviewed  Lab:  I have personally reviewed all pertinent results  Routine Prenatal on 2023   Component Date Value Ref Range Status   • Strep Grp B PCR 2023 Positive (A)  Negative Final       Imaging:  I have personally reviewed all pertinent results  Assessment/Plan   37 weeks gestation of pregnancy  25 yo f  presenting for 37w1d routine prenatal  - GBS positive, discussed will need IV antibiotics during labor  All questions answered to pt's satisfaction  - infrequent contractions and low backache   Discussed normal discomforts of late pregnancy and fetal kick counts   - per growth ultrasounds, fetal growth restriction has resolved and recommend repeat growth assessment in 3 to 4 weeks per  center, scheduled for    - f/u 1 week prenatal     Diagnoses and all orders for this visit:    37 weeks gestation of pregnancy    GBS (group B Streptococcus carrier), +RV culture, currently pregnant    Prenatal care in third trimester        - Routine follow up in 1 week  - Labs, [address] referrals, and patient information provided as relevant to current trimester    - PCP: No primary care provider on file  - OB Provider: No data on file  Angelina Kumar MD, PGY-3  [facility] Medicine   3/24/2023      Please be aware that this note contains text that was dictated and there may be errors pertaining to "sound-alike "words during the dictation process

## 2023-03-24 NOTE — ASSESSMENT & PLAN NOTE
27 yo f  presenting for 37w1d routine prenatal  - GBS positive, discussed will need IV antibiotics during labor  All questions answered to pt's satisfaction  - infrequent contractions and low backache   Discussed normal discomforts of late pregnancy and fetal kick counts   - per growth ultrasounds, fetal growth restriction has resolved and recommend repeat growth assessment in 3 to 4 weeks per  center, scheduled for    - f/u 1 week prenatal

## 2023-03-30 ENCOUNTER — ROUTINE PRENATAL (OUTPATIENT)
Dept: OBGYN CLINIC | Facility: CLINIC | Age: 23
End: 2023-03-30

## 2023-03-30 ENCOUNTER — APPOINTMENT (OUTPATIENT)
Dept: LAB | Facility: CLINIC | Age: 23
End: 2023-03-30

## 2023-03-30 VITALS
BODY MASS INDEX: 24.13 KG/M2 | SYSTOLIC BLOOD PRESSURE: 136 MMHG | DIASTOLIC BLOOD PRESSURE: 89 MMHG | WEIGHT: 127.8 LBS | HEART RATE: 102 BPM | HEIGHT: 61 IN

## 2023-03-30 DIAGNOSIS — Z34.93 PRENATAL CARE IN THIRD TRIMESTER: ICD-10-CM

## 2023-03-30 DIAGNOSIS — R51.9 PREGNANCY HEADACHE IN THIRD TRIMESTER: ICD-10-CM

## 2023-03-30 DIAGNOSIS — Z3A.38 38 WEEKS GESTATION OF PREGNANCY: Primary | ICD-10-CM

## 2023-03-30 DIAGNOSIS — O26.893 PREGNANCY HEADACHE IN THIRD TRIMESTER: ICD-10-CM

## 2023-03-30 DIAGNOSIS — Z3A.37 37 WEEKS GESTATION OF PREGNANCY: ICD-10-CM

## 2023-03-30 LAB
ALBUMIN SERPL BCP-MCNC: 3.2 G/DL (ref 3.5–5)
ALP SERPL-CCNC: 168 U/L (ref 46–116)
ALT SERPL W P-5'-P-CCNC: 19 U/L (ref 12–78)
ANION GAP SERPL CALCULATED.3IONS-SCNC: 5 MMOL/L (ref 4–13)
AST SERPL W P-5'-P-CCNC: 18 U/L (ref 5–45)
BASOPHILS # BLD AUTO: 0.04 THOUSANDS/ÂΜL (ref 0–0.1)
BASOPHILS NFR BLD AUTO: 0 % (ref 0–1)
BILIRUB SERPL-MCNC: 0.33 MG/DL (ref 0.2–1)
BUN SERPL-MCNC: 8 MG/DL (ref 5–25)
CALCIUM ALBUM COR SERPL-MCNC: 10.6 MG/DL (ref 8.3–10.1)
CALCIUM SERPL-MCNC: 10 MG/DL (ref 8.3–10.1)
CHLORIDE SERPL-SCNC: 105 MMOL/L (ref 96–108)
CO2 SERPL-SCNC: 24 MMOL/L (ref 21–32)
CREAT SERPL-MCNC: 0.48 MG/DL (ref 0.6–1.3)
EOSINOPHIL # BLD AUTO: 0.07 THOUSAND/ÂΜL (ref 0–0.61)
EOSINOPHIL NFR BLD AUTO: 1 % (ref 0–6)
ERYTHROCYTE [DISTWIDTH] IN BLOOD BY AUTOMATED COUNT: 14.5 % (ref 11.6–15.1)
GFR SERPL CREATININE-BSD FRML MDRD: 140 ML/MIN/1.73SQ M
GLUCOSE SERPL-MCNC: 72 MG/DL (ref 65–140)
HCT VFR BLD AUTO: 32.9 % (ref 34.8–46.1)
HGB BLD-MCNC: 10.4 G/DL (ref 11.5–15.4)
IMM GRANULOCYTES # BLD AUTO: 0.12 THOUSAND/UL (ref 0–0.2)
IMM GRANULOCYTES NFR BLD AUTO: 1 % (ref 0–2)
LYMPHOCYTES # BLD AUTO: 2.06 THOUSANDS/ÂΜL (ref 0.6–4.47)
LYMPHOCYTES NFR BLD AUTO: 19 % (ref 14–44)
MCH RBC QN AUTO: 27.7 PG (ref 26.8–34.3)
MCHC RBC AUTO-ENTMCNC: 31.6 G/DL (ref 31.4–37.4)
MCV RBC AUTO: 88 FL (ref 82–98)
MONOCYTES # BLD AUTO: 0.82 THOUSAND/ÂΜL (ref 0.17–1.22)
MONOCYTES NFR BLD AUTO: 8 % (ref 4–12)
NEUTROPHILS # BLD AUTO: 7.68 THOUSANDS/ÂΜL (ref 1.85–7.62)
NEUTS SEG NFR BLD AUTO: 71 % (ref 43–75)
NRBC BLD AUTO-RTO: 0 /100 WBCS
PLATELET # BLD AUTO: 274 THOUSANDS/UL (ref 149–390)
PMV BLD AUTO: 12.4 FL (ref 8.9–12.7)
POTASSIUM SERPL-SCNC: 4.3 MMOL/L (ref 3.5–5.3)
PROT SERPL-MCNC: 7.6 G/DL (ref 6.4–8.4)
RBC # BLD AUTO: 3.75 MILLION/UL (ref 3.81–5.12)
SODIUM SERPL-SCNC: 134 MMOL/L (ref 135–147)
WBC # BLD AUTO: 10.79 THOUSAND/UL (ref 4.31–10.16)

## 2023-03-30 NOTE — PROGRESS NOTES
Krishna Callahan presents today for routine OB visit at 38w0d  She is a   Blood Pressure: 136/89  Wt=58 kg (127 lb 12 8 oz); Body mass index is 23 82 kg/m² ; TWG=11 7 kg (25 lb 12 8 oz)  Fetal Heart Rate: 131; Fundal Height (cm): 37 cm  SVE today: Cervical Dilation: Fingertip /Cervical Effacement: 10 /Fetal Station: -3  Abdomen: gravid, soft, non-tender  She reports occasional reji genao  Also reports a mild intermittent HA over the past 2-3 days  HA resolves on its own with out intervention  No visual changes, RUQ pain, CP or SOB  Blood pressure is WNL  Denies uterine contractions  Denies vaginal bleeding or leaking of fluid  Reports adequate fetal movement of at least 10 movements in 2 hours once daily  Orders placed for preeclampsia labs  Warning signs reviewed  Reviewed labor precautions and fetal kick counts  Reviewed perineal massage for decreasing risk of perineal lacerations during delivery  Advised to continue medications and return in 1 week  Current Outpatient Medications   Medication Instructions   • doxylamine (UNISOM) 12 5 mg, Oral, Daily at bedtime PRN   • Prenatal MV & Min w/FA-DHA (One A Day Prenatal) 0 4-25 MG CHEW Oral   • pyridoxine (B-6) 25 mg, Oral, 3 times daily     Laboratory workup: initial OB labs (complete); 28 week labs (1hr GTT elevated at 139, 3hr GTT normal); 36 week cultures (GBS+)    Genetic Screening: NIPS low risk    Vaccinations: influenza (given 10/24/22); Tdap (received 23); COVID-19 (recommended)    Postpartum contraception: desires unsure     Fetal Ultrasounds:  10/12/22 13w6d: Normal growth  Posterior placenta  A surface placental cyst is appreciated adjacent to the umbilical cord insertion  Follow-up ultrasounds will further elucidate the significance of this finding which generally resolves throughout pregnancy  F/u at 20w  22 20w1d: Vertex  Normal growth  Posterior placenta   Bilateral low risk urinary tract dilation (UTDA1), which has a high likelihood of spontaneous resolution prenatally, or after birth  Follow-up evaluation of fetal growth and re-evaluation of fetal kidneys was advised at around 32 weeks gestation  23 32w1d: Vertex  Posterior placenta  Possible FGR, EFW 9% with abdomen in 6%  Fetal pyelectasis resolved  recommend close outpatient surveillance with weekly NST, and weekly evaluation of amniotic fluid and umbilical artery Dopplers  Fetal growth should be re-assessed every 3 weeks  If the above  surveillance is reassuring, and Doppler indices remain normal, delivery is recommended between 38 0/7 and 39 6/7 weeks gestation pending future growth scans  3/8/23 34w6d: Vertex  Posterior placenta  EFW 15%, fetal growth restriction has resolved  Both the EFW and abdominal circumference are over the 10th percentile  Follow-up in 3 to 4 weeks for repeat growth  No need for weekly NST/SHAQUILLE/Doppler in the interim     DELFINA 2023 Problems (from 22 to present)     Problem Noted Resolved    Poor fetal growth affecting management of mother in third trimester 2023 by Dilan Pedersen MD No    Overview Addendum 3/17/2023 10:40 AM by ANATOLY Bryant     -Noted at 32 weeks  9th percentile with normal Dopplers  -Recommend close outpatient surveillance with weekly NST, and weekly evaluation of amniotic fluid and umbilical artery Dopplers  Fetal growth should be re-assessed every 3 weeks  If the above  surveillance is reassuring, and Doppler indices remain normal, delivery is recommended between 38 0/7 and 39 6/7 weeks gestation pending future growth scans    -3/8/23 34w6d: Vertex  Posterior placenta  EFW 15%, fetal growth restriction has resolved  Both the EFW and abdominal circumference are over the 10th percentile  Follow-up in 3 to 4 weeks for repeat growth   No need for weekly  ST/SHAQUILLE/Doppler in the interim              Prenatal care in third trimester 10/24/2022 by Ann Meyers MD No Overview Addendum 10/24/2022  6:25 PM by Valeri Ibanez MD     Continue PNV  Level II U/S - scheduled 11/25  First trimester screening WNL  Flu 10/24/22  msAFP ordered, will complete in 19th week of pregnancy         38 weeks gestation of pregnancy 10/12/2022 by Cecilia West MD No    Overview Addendum 2/17/2023  8:50 AM by Valeri Martinez MD     TWG: + 7 167kg (recommended weight gain 25-35lbs)   TDAP vaccine at 28 weeks  Delivery consent signed at 28w  Contraception: undecided  Breastfeeding: yes   MSAFP to be drawn   RTC [id number] for routine prenatal care   11/25: fetal urinary tract dilatation, repeat growth at 32 weeks

## 2023-04-05 ENCOUNTER — TELEPHONE (OUTPATIENT)
Dept: OBGYN CLINIC | Facility: CLINIC | Age: 23
End: 2023-04-05

## 2023-04-05 ENCOUNTER — ULTRASOUND (OUTPATIENT)
Dept: PERINATAL CARE | Facility: CLINIC | Age: 23
End: 2023-04-05

## 2023-04-05 ENCOUNTER — TELEPHONE (OUTPATIENT)
Facility: HOSPITAL | Age: 23
End: 2023-04-05

## 2023-04-05 VITALS
DIASTOLIC BLOOD PRESSURE: 64 MMHG | SYSTOLIC BLOOD PRESSURE: 118 MMHG | HEIGHT: 61 IN | HEART RATE: 102 BPM | WEIGHT: 131.8 LBS | BODY MASS INDEX: 24.88 KG/M2

## 2023-04-05 DIAGNOSIS — O36.5990 PREGNANCY AFFECTED BY FETAL GROWTH RESTRICTION: ICD-10-CM

## 2023-04-05 DIAGNOSIS — Z3A.38 38 WEEKS GESTATION OF PREGNANCY: ICD-10-CM

## 2023-04-05 DIAGNOSIS — O36.5930 POOR FETAL GROWTH AFFECTING MANAGEMENT OF MOTHER IN THIRD TRIMESTER, SINGLE OR UNSPECIFIED FETUS: Primary | ICD-10-CM

## 2023-04-05 NOTE — TELEPHONE ENCOUNTER
Hilda received from Dr Ancelmo Chacon  Patient was at [facility]  today for growth scan which was consistent with Fetal Growth Restriction  M is recommending induction at 39w0d, she is currently 38w6d  Call placed to L&D  Induction scheduled for tomorrow at 7:30 AM  Call placed to patient and she is aware

## 2023-04-05 NOTE — LETTER
"2023    Cari Majano, [place]  [address] [place] [address]    Patient: Randell Tate   YOB: 2000   Date of Visit: 2023   Gestational age 38w7d   Debra Nathalia of this communication: Urgent: Patient with new diagnosis of mild FGR, recommended for induction tomorrow       Dear Cari Majano,    This patient was seen recently in our  office  The content of my evaluation today is in the ultrasound report under \"OB Procedures\" tab  Please don't hesitate to contact our office with any concerns or questions       Sincerely,      Jessica Griffith MD  Attending Physician, Maddy        "

## 2023-04-05 NOTE — TELEPHONE ENCOUNTER
Spoke with patient's  who provided another phone number to reach patient  Attempted calling second number but unsuccessful  Called main phone number and spoke with  again   He is at work and will relay the message that patient's appointment was changed to our [facility] office at 10:30 am

## 2023-04-05 NOTE — PROGRESS NOTES
"[address]: Ms Jono Baker was seen today for fetal growth assessment ultrasound  See ultrasound report under \"OB Procedures\" tab  The time spent on this established patient on the encounter date included 5 minutes previsit service time reviewing records and precharting, 10 minutes face-to-face service time counseling regarding results and coordinating care, and  5 minutes charting, totalling 20 minutes    Please don't hesitate to contact our office with any concerns or questions   -Heri Peralta MD      "

## 2023-04-06 ENCOUNTER — ANESTHESIA EVENT (INPATIENT)
Dept: ANESTHESIOLOGY | Facility: HOSPITAL | Age: 23
End: 2023-04-06

## 2023-04-06 ENCOUNTER — ANESTHESIA (INPATIENT)
Dept: ANESTHESIOLOGY | Facility: HOSPITAL | Age: 23
End: 2023-04-06

## 2023-04-06 PROBLEM — Z3A.39 39 WEEKS GESTATION OF PREGNANCY: Status: ACTIVE | Noted: 2022-10-12

## 2023-04-06 RX ORDER — LIDOCAINE HYDROCHLORIDE AND EPINEPHRINE 15; 5 MG/ML; UG/ML
INJECTION, SOLUTION EPIDURAL AS NEEDED
Status: DISCONTINUED | OUTPATIENT
Start: 2023-04-06 | End: 2023-04-06 | Stop reason: HOSPADM

## 2023-04-06 RX ORDER — EPHEDRINE SULFATE 50 MG/ML
INJECTION INTRAVENOUS AS NEEDED
Status: DISCONTINUED | OUTPATIENT
Start: 2023-04-06 | End: 2023-04-06 | Stop reason: HOSPADM

## 2023-04-06 RX ADMIN — EPHEDRINE SULFATE 10 MG: 50 INJECTION, SOLUTION INTRAVENOUS at 18:02

## 2023-04-06 RX ADMIN — LIDOCAINE HYDROCHLORIDE AND EPINEPHRINE 3 ML: 15; 5 INJECTION, SOLUTION EPIDURAL at 17:58

## 2023-04-06 RX ADMIN — LIDOCAINE HYDROCHLORIDE AND EPINEPHRINE 2 ML: 15; 5 INJECTION, SOLUTION EPIDURAL at 17:59

## 2023-04-06 NOTE — ANESTHESIA PROCEDURE NOTES
Epidural Block    Start time: 4/6/2023 5:56 PM  Reason for block: procedure for pain and at surgeon's request  Staffing  Performed: CRNA   Anesthesiologist: Micaela De Jesus MD  Resident/CRNA: Divya Bush CRNA  Preanesthetic Checklist  Completed: patient identified, IV checked, site marked, risks and benefits discussed, surgical consent, monitors and equipment checked, pre-op evaluation and timeout performed  Epidural  Patient position: sitting  Prep: ChloraPrep  Patient monitoring: cardiac monitor and frequent blood pressure checks  Approach: midline  Location: lumbar  Injection technique: SOFYA air  Needle  Needle type: Tuohy   Needle gauge: 18 G  Catheter type: side hole  Catheter size: 20 G  Catheter at skin depth: 9 cm  Test dose: negative  Assessment  Sensory level: T10  Number of attempts: 1negative aspiration for CSF, negative aspiration for heme and no paresthesia on injection  patient tolerated the procedure well with no immediate complications  Additional Notes  Unremarkable epidural

## 2023-04-07 NOTE — ANESTHESIA PREPROCEDURE EVALUATION
Procedure:  LABOR ANALGESIA    Relevant Problems   GYN   (+) 39 weeks gestation of pregnancy        Physical Exam    Airway    Mallampati score: II  TM Distance: >3 FB  Neck ROM: full     Dental   No notable dental hx     Cardiovascular  Rhythm: regular, Rate: normal, Cardiovascular exam normal    Pulmonary  Pulmonary exam normal Breath sounds clear to auscultation,     Other Findings        Anesthesia Plan  ASA Score- 2     Anesthesia Type- epidural with ASA Monitors  Additional Monitors:   Airway Plan:     Comment: Discussed the risks/benefits of neuraxial anesthesia, including risk of bleeding/infection/damage to underlying structures, the likely side effect of nausea, and unlikely complication of spinal headache  Consent done with language line  Plan Factors-    Induction-     Postoperative Plan-     Informed Consent- Anesthetic plan and risks discussed with patient  I personally reviewed this patient with the CRNA  Discussed and agreed on the Anesthesia Plan with the CRNA  Karissa Sanchez

## 2023-04-07 NOTE — ANESTHESIA POSTPROCEDURE EVALUATION
Post-Op Assessment Note    CV Status:  Stable  Pain Score: 2    Pain management: adequate     Mental Status:  Awake   Hydration Status:  Stable   PONV Controlled:  None   Airway Patency:  Patent      Post Op Vitals Reviewed: Yes      Staff: Anesthesiologist         No notable events documented      BP   117/61   Temp 97 8   Pulse 81   Resp 20   SpO2 100

## 2023-04-26 PROBLEM — Z34.93 PRENATAL CARE IN THIRD TRIMESTER: Status: RESOLVED | Noted: 2022-10-24 | Resolved: 2023-04-26

## 2023-04-26 PROBLEM — O99.820 GBS (GROUP B STREPTOCOCCUS CARRIER), +RV CULTURE, CURRENTLY PREGNANT: Status: RESOLVED | Noted: 2023-03-24 | Resolved: 2023-04-26

## 2023-04-26 PROBLEM — Z3A.39 39 WEEKS GESTATION OF PREGNANCY: Status: RESOLVED | Noted: 2022-10-12 | Resolved: 2023-04-26

## 2023-04-27 ENCOUNTER — POSTPARTUM VISIT (OUTPATIENT)
Dept: OBGYN CLINIC | Facility: CLINIC | Age: 23
End: 2023-04-27

## 2023-04-27 VITALS
BODY MASS INDEX: 21.86 KG/M2 | SYSTOLIC BLOOD PRESSURE: 118 MMHG | HEIGHT: 61 IN | DIASTOLIC BLOOD PRESSURE: 64 MMHG | HEART RATE: 77 BPM | WEIGHT: 115.8 LBS

## 2023-04-27 DIAGNOSIS — R03.0 ELEVATED BLOOD PRESSURE READING WITHOUT DIAGNOSIS OF HYPERTENSION: ICD-10-CM

## 2023-04-27 NOTE — PROGRESS NOTES
OB/GYN Postpartum Appointment    ASSESSMENT / PLAN:    Assessment/Plan     Normal postpartum exam      1  Contraception: Paragard IUD (office to call patient when in stock)  2  Lactation consult, [address] information discussed  3  Increase activity as tolerated; may return to sexual activity at 6w postpartum  4  Next pap smear due; patient to follow up for annual with pap smear  SUBJECTIVE:    Bia Traore is a 26 y o  female who presents for a postpartum visit  Her pregnancy was complicated by GBS positive status, FGR, elevated BP never meeting criteria for hypertension  She delivered via  on 23, 6 lb 13 4oz, male   Her delivery course was uncomplicated  Bleeding no bleeding  Bowel function is normal  Bladder function is normal  Patient has not traveled outside the [place]  within the last 14 days  been sexually active  Desired contraception method is Paragard IUD  [de-identified] course has been uneventful, baby is doing well  Baby has thrush currently, but patient denies any breast concerns or complaints      Baby is feeding by breast     Last Pap: due for first routine screening pap smear, will collect at annual appointment  Gestational Diabetes: no      Current Outpatient Medications:   •  Prenatal MV & Min w/FA-DHA (One A Day Prenatal) 0 4-25 MG CHEW, Chew, Disp: , Rfl:   •  acetaminophen (TYLENOL) 325 mg tablet, Take 2 tablets (650 mg total) by mouth every 4 (four) hours as needed for mild pain (Patient not taking: Reported on 2023), Disp: , Rfl: 0  •  docusate sodium (COLACE) 100 mg capsule, Take 1 capsule (100 mg total) by mouth 2 (two) times a day (Patient not taking: Reported on 2023), Disp: , Rfl: 0  •  ibuprofen (MOTRIN) 200 mg tablet, Take 3 tablets (600 mg total) by mouth every 6 (six) hours as needed for mild pain or moderate pain (Patient not taking: Reported on 2023), Disp: , Rfl:     No Known Allergies    Review of Systems:  Review of Systems "  Constitutional: Negative for chills and fever  Eyes: Negative for visual disturbance  Respiratory: Negative for cough and shortness of breath  Cardiovascular: Negative for chest pain and leg swelling  Gastrointestinal: Negative for abdominal pain, diarrhea, nausea and vomiting  Genitourinary: Negative for dysuria, frequency, hematuria, pelvic pain, urgency, vaginal bleeding and vaginal discharge  Neurological: Negative for dizziness, light-headedness and headaches  OBJECTIVE:    /64   Pulse 77   Ht 5' 1\" (1 549 m)   Wt 52 5 kg (115 lb 12 8 oz)   LMP 06/16/2022   Breastfeeding Yes   BMI 21 88 kg/m²     Physical Exam  Constitutional:       General: She is not in acute distress  Appearance: Normal appearance  She is not ill-appearing  HENT:      Mouth/Throat:      Mouth: Mucous membranes are moist    Eyes:      Extraocular Movements: Extraocular movements intact  Cardiovascular:      Rate and Rhythm: Normal rate  Pulmonary:      Effort: Pulmonary effort is normal    Abdominal:      General: There is no distension  Palpations: Abdomen is soft  Tenderness: There is no abdominal tenderness  There is no guarding  Musculoskeletal:         General: No swelling  Right lower leg: No edema  Left lower leg: No edema  Neurological:      General: No focal deficit present  Mental Status: She is alert  Skin:     General: Skin is warm and dry  Coloration: Skin is not jaundiced or pale  Psychiatric:         Mood and Affect: Mood normal          Behavior: Behavior normal          Thought Content:  Thought content normal          Judgment: Judgment normal              Angel Luis Laughlin MD  04/27/23  5:23 PM        "

## 2023-05-18 ENCOUNTER — PROCEDURE VISIT (OUTPATIENT)
Dept: OBGYN CLINIC | Facility: CLINIC | Age: 23
End: 2023-05-18

## 2023-05-18 VITALS
HEIGHT: 61 IN | RESPIRATION RATE: 18 BRPM | BODY MASS INDEX: 21.49 KG/M2 | SYSTOLIC BLOOD PRESSURE: 126 MMHG | DIASTOLIC BLOOD PRESSURE: 85 MMHG | WEIGHT: 113.8 LBS | HEART RATE: 123 BPM

## 2023-05-18 DIAGNOSIS — Z32.02 PREGNANCY TEST NEGATIVE: ICD-10-CM

## 2023-05-18 DIAGNOSIS — Z30.430 ENCOUNTER FOR INSERTION OF PARAGARD IUD: Primary | ICD-10-CM

## 2023-05-18 LAB — SL AMB POCT URINE HCG: NORMAL

## 2023-05-18 RX ORDER — COPPER 313.4 MG/1
1 INTRAUTERINE DEVICE INTRAUTERINE ONCE
Status: COMPLETED | OUTPATIENT
Start: 2023-05-18 | End: 2023-05-18

## 2023-05-18 RX ADMIN — COPPER 1 INTRA UTERINE DEVICE: 313.4 INTRAUTERINE DEVICE INTRAUTERINE at 17:15

## 2023-05-18 NOTE — PROGRESS NOTES
Iud insertions    Date/Time: 5/18/2023 5:15 PM  Performed by: Halley Erwin MD  Authorized by: Halley Erwin MD     Other Assisting Provider: Yes (comment) (Dr Eris Krueger)    Verbal consent obtained?: Yes    Written consent obtained?: Yes    Risks and benefits: Risks, benefits and alternatives were discussed    Consent given by:  Patient  Time Out:     Time out: Immediately prior to the procedure a time out was called    Patient states understanding of procedure being performed: Yes    Patient's understanding of procedure matches consent: Yes    Procedure consent matches procedure scheduled: Yes    Relevant documents present and verified: Yes    Test results available and properly labeled: Yes    Radiology Images displayed and confirmed  If images not available, report reviewed: Yes    Required items: Required blood products, implants, devices and special equipment available    Patient identity confirmed:  Verbally with patient and provided demographic data  Procedure:     Pelvic exam performed: yes      Negative urine pregnancy test: yes      Cervix cleaned and prepped: yes      Speculum placed in vagina: yes      Tenaculum applied to cervix: yes      Uterus sounded: yes      Uterus sound depth (cm):  8    IUD inserted with no complications: yes      IUD type:  ParaGard    Strings trimmed: yes (2cm)    Post-procedure:     Patient tolerated procedure well: yes      Patient will follow up after next period: yes        - Discussed risks and benefits of IUD placement  Consent forms signed  Time out completed  - Speculum was placed and cervix was visualized, significantly anteriorly displaced cervix, retroverted uterus  - Betadine swabs were used to clean the cervix  - Single toothed tenaculum clamp was placed at anterior aspect of cervix for traction   - Sound was inserted to measure depth of uterine cavity, which was 8 cm  Cervix was then dilated  - IUD was loaded, blue flange adjusted to 8 cm   Loaded insertion tube was then passed through the cervix and IUD was inserted  Strings were visualized upon withdrawal of insertion tube and trimmed to 2cm  - Transabdominal ultrasound confirmed correct placement adjacent to uterine fundus  - Patient tolerated procedure well  - Dr Berna Delgado present during procedure      Exp: [id number]  Lot: Vandana Hunter MD  5/18/2023  5:25 PM

## 2023-05-22 ENCOUNTER — OFFICE VISIT (OUTPATIENT)
Dept: OBGYN CLINIC | Facility: CLINIC | Age: 23
End: 2023-05-22

## 2023-05-22 VITALS
WEIGHT: 112 LBS | BODY MASS INDEX: 21.14 KG/M2 | HEART RATE: 97 BPM | HEIGHT: 61 IN | SYSTOLIC BLOOD PRESSURE: 119 MMHG | DIASTOLIC BLOOD PRESSURE: 51 MMHG

## 2023-05-22 DIAGNOSIS — Z97.5 IUD (INTRAUTERINE DEVICE) IN PLACE: Primary | ICD-10-CM

## 2023-05-22 NOTE — PROGRESS NOTES
"[address] [facility] [address]  Phone#  [phone]  Fax#  [phone]  [address] [place] [address] Way VISIT      Subjective     [address] [facility] Radha Dyson is a 26 y o  female here for string protrusion below her hymenal ring  Patient underwent a IUD placement Paragard last 22  Personal health questionnaire reviewed: yes  Gynecologic History  Patient's last menstrual period was 2022  Contraception:IUD    Obstetric History  OB History    Para Term  AB Living   1 1 1     1   SAB IAB Ectopic Multiple Live Births         0 1      # Outcome Date GA Lbr Eliecer/2nd Weight Sex Delivery Anes PTL Lv   1 Term 23 39w0d / 00:04 3100 g (6 lb 13 4 oz) M Vag-Spont EPI N REDDY         The following portions of the patient's history were reviewed and updated as appropriate: allergies, current medications, past family history, past medical history, past social history, past surgical history and problem list     Review of Systems  Pertinent items are noted in HPI        Objective     /51   Pulse 97   Ht 5' 1\" (1 549 m)   Wt 50 8 kg (112 lb)   LMP 2022   Breastfeeding Yes   BMI 21 16 kg/m²     General Appearance:    Alert, cooperative, no distress, appears stated age   Head:    Normocephalic, without obvious abnormality, atraumatic   Eyes:    PERRL, conjunctiva/corneas clear, EOM's intact, fundi     benign, both eyes   Ears:    Normal TM's and external ear canals, both ears   Nose:   Nares normal, septum midline, mucosa normal, no drainage    or sinus tenderness   Throat:   Lips, mucosa, and tongue normal; teeth and gums normal   Neck:   Supple, symmetrical, trachea midline, no adenopathy;     thyroid:  no enlargement/tenderness/nodules; no carotid    bruit or JVD   Back:     Symmetric, no curvature, ROM normal, no CVA tenderness   Lungs:     Clear to auscultation bilaterally, respirations unlabored   Chest " Wall:    No tenderness or deformity    Heart:    Regular rate and rhythm, S1 and S2 normal, no murmur, rub   or gallop   Breast Exam:    No tenderness, masses, or nipple abnormality   Abdomen:     Soft, non-tender, bowel sounds active all four quadrants,     no masses, no organomegaly   Genitalia:    Normal female without lesion, discharge or tenderness, IUD string approximately 5 cm below the external os   Rectal:    Normal tone, no masses or tenderness; guaiac negative stool   Extremities:   Extremities normal, atraumatic, no cyanosis or edema   Pulses:   2+ and symmetric all extremities   Skin:   Skin color, texture, turgor normal, no rashes or lesions   Lymph nodes:   Cervical, supraclavicular, and axillary nodes normal   Neurologic:   CNII-XII intact, normal strength, sensation and reflexes     throughout         Assessment/Plan    IUD Paragard in place  Patient with IUD Paragard in place  Strings have been cut, 1 cm below the external os  Patient is experiencing mild bleeding since insertion  Follow up in 1 month for strings check has been discussed with patient     D/w Dr Lenny Paniagua MD

## 2023-06-19 ENCOUNTER — OFFICE VISIT (OUTPATIENT)
Dept: OBGYN CLINIC | Facility: CLINIC | Age: 23
End: 2023-06-19

## 2023-06-19 VITALS
HEART RATE: 108 BPM | WEIGHT: 109 LBS | DIASTOLIC BLOOD PRESSURE: 88 MMHG | BODY MASS INDEX: 20.58 KG/M2 | HEIGHT: 61 IN | SYSTOLIC BLOOD PRESSURE: 129 MMHG

## 2023-06-19 DIAGNOSIS — Z97.5 IUD (INTRAUTERINE DEVICE) IN PLACE: Primary | ICD-10-CM

## 2023-06-19 PROCEDURE — 99213 OFFICE O/P EST LOW 20 MIN: CPT | Performed by: OBSTETRICS & GYNECOLOGY

## 2023-06-19 NOTE — PROGRESS NOTES
[address]   IUD String Check  Name: Freddy Ruiz  MRN: [id number]  : 2000      ASSESSMENT/PLAN:  Problem   IUD (Intrauterine Device) in [address] placed 2023  Strings cut 2/2 discomfort on   Today, strings visualized on speculum exam 2cm from external cervical os  Patient reports no further discomfort or issues with IUD  RTC in 6 months for annual exam and pap smear           SUBJECTIVE:  Drea Bartlett is a pleasant 21-year-old -0-0-1 who presents today for IUD string check  ParaGard was placed on May 18 without complication and strings were cut 2 cm past the introitus  Patient presented on May 22 with discomfort secondary to strings, and strings were cut further  Patient presents today for string check and strings were visualized on speculum exam 2 cm from the external os  Denies continued discomfort or other acute concerns  Plan to follow-up in 6 months for annual exam and first Pap smear  OBJECTIVE:  Vitals:    23 1506   BP: 129/88   Pulse: (!) 108       Physical Exam  Constitutional:       General: She is not in acute distress  HENT:      Head: Normocephalic  Right Ear: External ear normal       Left Ear: External ear normal    Eyes:      General: No scleral icterus  Right eye: No discharge  Left eye: No discharge  Conjunctiva/sclera: Conjunctivae normal    Cardiovascular:      Rate and Rhythm: Normal rate  Pulses: Normal pulses  Pulmonary:      Effort: Pulmonary effort is normal  No respiratory distress  Abdominal:      General: Abdomen is flat  Palpations: Abdomen is soft  Genitourinary:     General: Normal vulva  Vagina: No vaginal discharge  Comments: Speculum exam with Paraguard IUD strings visualized protruding 2cm past the external cervical os  Mild amount of blood in vaginal vault, no active bleeding  Musculoskeletal:         General: No swelling or tenderness   Normal range of motion  Cervical back: Normal range of motion  Right lower leg: No edema  Left lower leg: No edema  Skin:     General: Skin is warm and dry  Capillary Refill: Capillary refill takes less than 2 seconds  Neurological:      Mental Status: She is alert and oriented to person, place, and time  Mental status is at baseline     Psychiatric:         Mood and Affect: Mood normal          Behavior: Behavior normal            Palomo Lees MD  OB/GYN PGY-2  6/19/2023  3:23 PM